# Patient Record
Sex: FEMALE | URBAN - METROPOLITAN AREA
[De-identification: names, ages, dates, MRNs, and addresses within clinical notes are randomized per-mention and may not be internally consistent; named-entity substitution may affect disease eponyms.]

---

## 2017-03-02 ENCOUNTER — APPOINTMENT (RX ONLY)
Dept: URBAN - METROPOLITAN AREA CLINIC 116 | Facility: CLINIC | Age: 52
Setting detail: DERMATOLOGY
End: 2017-03-02

## 2017-03-02 DIAGNOSIS — L71.8 OTHER ROSACEA: ICD-10-CM

## 2017-03-02 PROCEDURE — ? COUNSELING

## 2017-03-02 PROCEDURE — ? PRESCRIPTION

## 2017-03-02 PROCEDURE — 99213 OFFICE O/P EST LOW 20 MIN: CPT

## 2017-03-02 PROCEDURE — ? TREATMENT REGIMEN

## 2017-03-02 RX ORDER — IVERMECTIN 10 MG/G
CREAM TOPICAL
Qty: 1 | Refills: 5 | Status: ERX

## 2017-03-02 ASSESSMENT — LOCATION SIMPLE DESCRIPTION DERM: LOCATION SIMPLE: LEFT CHEEK

## 2017-03-02 ASSESSMENT — LOCATION DETAILED DESCRIPTION DERM: LOCATION DETAILED: LEFT INFERIOR CENTRAL MALAR CHEEK

## 2017-03-02 ASSESSMENT — LOCATION ZONE DERM: LOCATION ZONE: FACE

## 2017-03-02 NOTE — PROCEDURE: TREATMENT REGIMEN
Continue Regimen: Soolantra apply to face daily
Samples Given: [facility]
Detail Level: Zone

## 2017-03-10 ENCOUNTER — RX ONLY (OUTPATIENT)
Age: 52
Setting detail: RX ONLY
End: 2017-03-10

## 2017-03-10 RX ORDER — IVERMECTIN 10 MG/G
CREAM TOPICAL
Qty: 1 | Refills: 5 | Status: ERX

## 2017-03-15 ENCOUNTER — RX ONLY (OUTPATIENT)
Age: 52
Setting detail: RX ONLY
End: 2017-03-15

## 2017-03-15 RX ORDER — IVERMECTIN 10 MG/G
CREAM TOPICAL
Qty: 1 | Refills: 3 | Status: ERX

## 2019-09-05 ENCOUNTER — IMPORTED ENCOUNTER (OUTPATIENT)
Dept: URBAN - METROPOLITAN AREA CLINIC 31 | Facility: CLINIC | Age: 54
End: 2019-09-05

## 2019-09-05 PROBLEM — H40.013: Noted: 2019-09-05

## 2019-09-05 PROBLEM — H40.053: Noted: 2019-09-05

## 2019-09-05 PROCEDURE — 92004 COMPRE OPH EXAM NEW PT 1/>: CPT

## 2019-09-05 NOTE — PATIENT DISCUSSION
1.  Ocular HTN OU:  Elevated intraocular pressure without signs of glaucomatous damage to the optic nerve. Will continue to monitor. 2. Glaucoma suspect OU - No signs of glaucomatous damage to the optic nerve based on todays examination and testing. Will continue to monitor. OHT with thin corneas. Estelle Percival from prior records. Prior allergic reaction to Lumigan. Family history of glaucoma. Trial Cosopt PF dispensed to be used BID OU. Re-check IOP in a week to see if IOP reduced and if tolerable. If sucessful my go to generic Dorzolamide-Timolol.

## 2019-10-03 ENCOUNTER — IMPORTED ENCOUNTER (OUTPATIENT)
Dept: URBAN - METROPOLITAN AREA CLINIC 31 | Facility: CLINIC | Age: 54
End: 2019-10-03

## 2019-10-03 PROBLEM — H40.053: Noted: 2019-10-03

## 2019-10-03 PROCEDURE — 92020 GONIOSCOPY: CPT

## 2019-10-03 PROCEDURE — 99213 OFFICE O/P EST LOW 20 MIN: CPT

## 2019-10-03 NOTE — PATIENT DISCUSSION
Ocular HTN OU:  Elevated intraocular pressure without signs of glaucomatous damage to the optic nerve. Will continue to monitor. Gonio performed today. Offered Latanoprost - the patient wants to stay with Continue Cosopt PF BID OU.  LFA 3 months IOP check

## 2020-01-10 ENCOUNTER — IMPORTED ENCOUNTER (OUTPATIENT)
Dept: URBAN - METROPOLITAN AREA CLINIC 31 | Facility: CLINIC | Age: 55
End: 2020-01-10

## 2020-01-10 PROBLEM — H40.013: Noted: 2020-01-10

## 2020-01-10 PROBLEM — H40.053: Noted: 2020-01-10

## 2020-01-10 PROCEDURE — 99214 OFFICE O/P EST MOD 30 MIN: CPT

## 2020-01-10 NOTE — PATIENT DISCUSSION
1.  Ocular HTN OU:  Elevated intraocular pressure without signs of glaucomatous damage to the optic nerve. Will continue to monitor for development of glaucoma. Continue Dorzolamide-Timolol OU BID. 2. Glaucoma suspect OU - Get OCT IOP check 6 mos. María Dailey

## 2020-05-08 ENCOUNTER — IMPORTED ENCOUNTER (OUTPATIENT)
Dept: URBAN - METROPOLITAN AREA CLINIC 31 | Facility: CLINIC | Age: 55
End: 2020-05-08

## 2020-05-08 PROBLEM — H40.053: Noted: 2020-05-08

## 2020-05-08 PROBLEM — H01.116: Noted: 2020-05-08

## 2020-05-08 PROBLEM — H40.013: Noted: 2020-05-08

## 2020-05-08 PROCEDURE — 99213 OFFICE O/P EST LOW 20 MIN: CPT

## 2020-05-08 NOTE — PATIENT DISCUSSION
1.  Ocular HTN OU:  Elevated intraocular pressure without signs of glaucomatous damage to the optic nerve. Will continue to monitor for development of glaucoma. 2. Glaucoma suspect OU -Get OCT TA here after. 3. Contact Dermatitis OS - The left lower eyelid is swollen secondary to contact with an allergen. Explain drop excess. Lotemax kiarra sample given BID for several days until gone.

## 2020-05-11 ENCOUNTER — IMPORTED ENCOUNTER (OUTPATIENT)
Dept: URBAN - METROPOLITAN AREA CLINIC 31 | Facility: CLINIC | Age: 55
End: 2020-05-11

## 2020-05-11 PROCEDURE — 92133 CPTRZD OPH DX IMG PST SGM ON: CPT

## 2020-05-18 ENCOUNTER — IMPORTED ENCOUNTER (OUTPATIENT)
Dept: URBAN - METROPOLITAN AREA CLINIC 31 | Facility: CLINIC | Age: 55
End: 2020-05-18

## 2020-05-18 PROBLEM — H40.053: Noted: 2020-05-18

## 2020-05-18 PROBLEM — H01.116: Noted: 2020-05-18

## 2020-05-18 PROBLEM — H40.013: Noted: 2020-05-18

## 2020-05-18 PROCEDURE — 99213 OFFICE O/P EST LOW 20 MIN: CPT

## 2020-05-18 NOTE — PATIENT DISCUSSION
1.  Glaucoma suspect OU/OHT - Good OCT. Mild response to drop. Allergies to drops as well. Get TA in 6 mos. OCT yearly. T/C SLT? 2. Contact Dermatitis OS - The left eyelids are swollen secondary to contact with an allergen. Recurrence. Use kiarra a week then taper.

## 2021-03-02 ENCOUNTER — IMPORTED ENCOUNTER (OUTPATIENT)
Dept: URBAN - METROPOLITAN AREA CLINIC 31 | Facility: CLINIC | Age: 56
End: 2021-03-02

## 2021-03-02 PROBLEM — H40.1121: Noted: 2021-03-02

## 2021-03-02 PROBLEM — H40.013: Noted: 2021-03-02

## 2021-03-02 PROBLEM — H40.053: Noted: 2021-03-02

## 2021-03-02 PROBLEM — H40.1112: Noted: 2021-03-02

## 2021-03-02 PROCEDURE — 92014 COMPRE OPH EXAM EST PT 1/>: CPT

## 2021-03-02 PROCEDURE — 92015 DETERMINE REFRACTIVE STATE: CPT

## 2021-03-02 PROCEDURE — 92250 FUNDUS PHOTOGRAPHY W/I&R: CPT

## 2021-03-02 NOTE — PATIENT DISCUSSION
1.  Primary open angle glaucoma OD moderate- Continue with current treatment plan. Discussed importance of compliance. Will continue to monitor for stability or progression. 2.  Primary open angle glaucoma OS mild -  Continue with current treatment plan. Discussed importance of compliance. Will continue to monitor for stability or progression. Poor control with large IOP spike. Compliance? No drops for 24 hrs. Re-start Cosopt (PF) and add Rhopressa (sample given).   F/U 24 hr recheck IOP and F/U with glc eval with Dr. Shelly Phillips

## 2021-03-04 ENCOUNTER — IMPORTED ENCOUNTER (OUTPATIENT)
Dept: URBAN - METROPOLITAN AREA CLINIC 31 | Facility: CLINIC | Age: 56
End: 2021-03-04

## 2021-03-04 PROBLEM — H40.1121: Noted: 2021-03-04

## 2021-03-04 PROBLEM — H40.1112: Noted: 2021-03-04

## 2021-03-04 PROCEDURE — 99213 OFFICE O/P EST LOW 20 MIN: CPT

## 2021-03-04 NOTE — PATIENT DISCUSSION
1.  Primary open angle glaucoma OD moderate- Continue with current treatment plan of Cospt and Rhopressa. Discussed importance of compliance. Will continue to monitor for stability or progression. Explained to patient that decrease in IOP secondary to resuming gtt will take at least a week to see significant drop. 2. Primary open angle glaucoma OS mild -  Same as OD. 3. Return for an appointment in 5 days for pressure check with Dr. Lincoln See.

## 2021-03-16 ENCOUNTER — IMPORTED ENCOUNTER (OUTPATIENT)
Dept: URBAN - METROPOLITAN AREA CLINIC 31 | Facility: CLINIC | Age: 56
End: 2021-03-16

## 2021-03-16 PROBLEM — H40.1132: Noted: 2021-03-16

## 2021-03-16 PROCEDURE — 99213 OFFICE O/P EST LOW 20 MIN: CPT

## 2021-03-16 NOTE — PATIENT DISCUSSION
Primary Open Angle Glaucoma OU moderate -  Continue with current treatment plan. Discussed importance of compliance. Will continue to monitor for stability or progression. Poor response to medical therapy. Both drop intolerance and poor effect. Using Cosopt BID OU. Sample of Rocklantan given to use hs. although prior hx of Latanoprost allergy as Rhopressa not working well. Explained to patient she needs to see glaucoma specialist as her pressure is far too high to sustain ocular health as is. Appointment made with Dr. Jessica Carballo.

## 2022-04-01 ASSESSMENT — TONOMETRY
OS_IOP_MMHG: 25
OS_IOP_MMHG: 34
OD_IOP_MMHG: 25
OD_IOP_MMHG: 41
OS_IOP_MMHG: 34
OS_IOP_MMHG: 23
OD_IOP_MMHG: 36
OD_IOP_MMHG: 38
OD_IOP_MMHG: 25
OS_IOP_MMHG: 20
OD_IOP_MMHG: 25
OS_IOP_MMHG: 25
OS_IOP_MMHG: 33
OD_IOP_MMHG: 21

## 2022-04-01 ASSESSMENT — VISUAL ACUITY
OD_SC: J220''
OS_CC: 20/25
OS_CC: 20/20-3
OD_CC: 20/20
OS_CC: 20/20
OD_CC: 20/20-1
OD_CC: 20/40
OS_SC: J220''
OD_CC: 20/25+2
OD_CC: 20/40-1
OS_CC: 20/20-2
OU_CC: 20/20+2
OS_CC: 20/20
OS_CC: 20/20
OD_CC: 20/20
OD_CC: 20/25
OU_SC: 20/30+2
OD_PH: SC 20/25 +2
OS_CC: 20/20-2
OD_CC: 20/20
OS_CC: 20/20

## 2025-07-01 ENCOUNTER — APPOINTMENT (OUTPATIENT)
Dept: URBAN - METROPOLITAN AREA CLINIC 122 | Facility: CLINIC | Age: 60
Setting detail: DERMATOLOGY
End: 2025-07-01

## 2025-07-01 DIAGNOSIS — L82.1 OTHER SEBORRHEIC KERATOSIS: ICD-10-CM

## 2025-07-01 DIAGNOSIS — L81.4 OTHER MELANIN HYPERPIGMENTATION: ICD-10-CM

## 2025-07-01 DIAGNOSIS — L71.8 OTHER ROSACEA: ICD-10-CM

## 2025-07-01 DIAGNOSIS — L57.8 OTHER SKIN CHANGES DUE TO CHRONIC EXPOSURE TO NONIONIZING RADIATION: ICD-10-CM

## 2025-07-01 PROCEDURE — ? SUNSCREEN RECOMMENDATIONS

## 2025-07-01 PROCEDURE — ?

## 2025-07-01 PROCEDURE — ? COUNSELING

## 2025-07-01 PROCEDURE — ? PRESCRIPTION MEDICATION MANAGEMENT

## 2025-07-01 ASSESSMENT — LOCATION SIMPLE DESCRIPTION DERM
LOCATION SIMPLE: LEFT CHEEK
LOCATION SIMPLE: RIGHT LOWER BACK
LOCATION SIMPLE: RIGHT CHEEK
LOCATION SIMPLE: LEFT UPPER BACK
LOCATION SIMPLE: UPPER BACK

## 2025-07-01 ASSESSMENT — LOCATION DETAILED DESCRIPTION DERM
LOCATION DETAILED: INFERIOR THORACIC SPINE
LOCATION DETAILED: LEFT CENTRAL MALAR CHEEK
LOCATION DETAILED: RIGHT CENTRAL MALAR CHEEK
LOCATION DETAILED: RIGHT SUPERIOR MEDIAL MIDBACK
LOCATION DETAILED: LEFT INFERIOR CENTRAL MALAR CHEEK
LOCATION DETAILED: LEFT SUPERIOR MEDIAL UPPER BACK

## 2025-07-01 ASSESSMENT — LOCATION ZONE DERM
LOCATION ZONE: TRUNK
LOCATION ZONE: FACE

## 2025-07-01 NOTE — PROCEDURE: PRESCRIPTION MEDICATION MANAGEMENT
Render In Strict Bullet Format?: No
Plan: Patient will call when needing refills
Detail Level: Zone
Continue Regimen: Finacea (Azelaic Acid) 15% Gel bid to AA as directed